# Patient Record
Sex: MALE | Race: WHITE | URBAN - METROPOLITAN AREA
[De-identification: names, ages, dates, MRNs, and addresses within clinical notes are randomized per-mention and may not be internally consistent; named-entity substitution may affect disease eponyms.]

---

## 2017-04-24 PROCEDURE — 81003 URINALYSIS AUTO W/O SCOPE: CPT | Performed by: FAMILY MEDICINE

## 2017-04-26 PROBLEM — E78.00 PURE HYPERCHOLESTEROLEMIA: Status: ACTIVE | Noted: 2017-04-26

## 2017-04-26 PROBLEM — J32.9 CHRONIC CONGESTION OF PARANASAL SINUS: Status: ACTIVE | Noted: 2017-04-26

## 2017-04-26 PROBLEM — Z00.00 ROUTINE GENERAL MEDICAL EXAMINATION AT A HEALTH CARE FACILITY: Status: ACTIVE | Noted: 2017-04-26

## 2017-04-26 PROBLEM — N40.1 BENIGN NON-NODULAR PROSTATIC HYPERPLASIA WITH LOWER URINARY TRACT SYMPTOMS: Status: ACTIVE | Noted: 2017-04-26

## 2017-04-26 PROBLEM — H93.13 TINNITUS, BILATERAL: Status: ACTIVE | Noted: 2017-04-26

## 2018-01-16 ENCOUNTER — OFFICE VISIT (OUTPATIENT)
Dept: FAMILY MEDICINE CLINIC | Facility: CLINIC | Age: 53
End: 2018-01-16

## 2018-01-16 VITALS
RESPIRATION RATE: 16 BRPM | OXYGEN SATURATION: 99 % | TEMPERATURE: 98 F | HEIGHT: 69 IN | SYSTOLIC BLOOD PRESSURE: 136 MMHG | BODY MASS INDEX: 29.33 KG/M2 | HEART RATE: 58 BPM | DIASTOLIC BLOOD PRESSURE: 80 MMHG | WEIGHT: 198 LBS

## 2018-01-16 DIAGNOSIS — J98.01 COUGH DUE TO BRONCHOSPASM: ICD-10-CM

## 2018-01-16 DIAGNOSIS — J20.9 ACUTE BRONCHITIS, UNSPECIFIED ORGANISM: Primary | ICD-10-CM

## 2018-01-16 PROCEDURE — 94640 AIRWAY INHALATION TREATMENT: CPT | Performed by: NURSE PRACTITIONER

## 2018-01-16 PROCEDURE — 99203 OFFICE O/P NEW LOW 30 MIN: CPT | Performed by: NURSE PRACTITIONER

## 2018-01-16 RX ORDER — ALBUTEROL SULFATE 2.5 MG/3ML
2.5 SOLUTION RESPIRATORY (INHALATION) ONCE
Status: COMPLETED | OUTPATIENT
Start: 2018-01-16 | End: 2018-01-16

## 2018-01-16 RX ADMIN — ALBUTEROL SULFATE 2.5 MG: 2.5 SOLUTION RESPIRATORY (INHALATION) at 15:16:00

## 2018-01-16 NOTE — PATIENT INSTRUCTIONS
· Use Albuterol inhaler 2 puffs every 4-6 hours while awake for the next 48 hours, then every 4-6 hours as needed for cough spasms/difficulty breathing/wheezing/shortness of breath.     · Drink a lot of fluids; increase rest  · Use cool mist humidifier  · O medicines. Also talk to your provider if you are taking medicine to prevent blood clots.) Aspirin should never be given to anyone younger than 25years of age who is ill with a viral infection or fever. It may cause severe liver or brain damage.   · Your ap breath, or pain with breathing  Date Last Reviewed: 9/13/2015  © 8962-4090 [employer]. [address] [facility], [facility] [place]. All rights reserved. This information is not intended as a substitute for professional medical care.  Always fol

## 2018-01-16 NOTE — PROGRESS NOTES
CHIEF COMPLAINT:   Patient presents with:  Cough: chest congestion        HPI:   Arron Quan is a 47year old male who presents for cough for  4  days. Reports sx began 7 days ago with sudden onset of fever to 100F, body aches and sinus congestion.   Whit Babcock GENERAL: well developed, well nourished,in no apparent distress  SKIN: no rashes, no suspicious lesions  EYES: Conjunctiva clear. No scleral icterus. HENT: Atraumatic, normocephalic. TM's clear bilaterally.   Nostrils patent, nasal mucosa mildly inflamed · If your symptoms worsen, you become short-of-breath, develop fever, worse cough etc, you must follow-up with a physician immediately or go to the emergency room.          Viral or Bacterial Bronchitis with Wheezing (Adult)    Bronchitis is an infection of · Your appetite may be poor, so a light diet is fine. Avoid dehydration by drinking 6 to 8 glasses of fluids per day (such as water, soft drinks, sports drinks, juices, tea, or soup). Extra fluids will help loosen secretions in the nose and lungs.   · Over- © 9123-2528 [employer]. 1407 [facility], [facility] [place]. All rights reserved. This information is not intended as a substitute for professional medical care. Always follow your healthcare professional's instructions.             The

## 2018-05-25 PROCEDURE — 81003 URINALYSIS AUTO W/O SCOPE: CPT | Performed by: FAMILY MEDICINE

## 2018-07-10 PROBLEM — E78.00 PURE HYPERCHOLESTEROLEMIA: Chronic | Status: ACTIVE | Noted: 2017-04-26

## 2018-07-10 PROBLEM — K42.9 UMBILICAL HERNIA WITHOUT OBSTRUCTION AND WITHOUT GANGRENE: Status: ACTIVE | Noted: 2018-07-10

## 2018-07-16 PROBLEM — M16.0 PRIMARY OSTEOARTHRITIS OF BOTH HIPS: Status: ACTIVE | Noted: 2018-07-16

## 2019-03-14 PROCEDURE — 81003 URINALYSIS AUTO W/O SCOPE: CPT | Performed by: FAMILY MEDICINE

## 2019-04-04 PROBLEM — D12.3 ADENOMATOUS POLYP OF TRANSVERSE COLON: Status: ACTIVE | Noted: 2019-04-04

## 2019-04-04 PROBLEM — M79.642 PAIN IN BOTH HANDS: Status: ACTIVE | Noted: 2019-04-04

## 2019-04-04 PROBLEM — M79.641 PAIN IN BOTH HANDS: Status: ACTIVE | Noted: 2019-04-04

## 2019-05-15 PROCEDURE — 86704 HEP B CORE ANTIBODY TOTAL: CPT | Performed by: INTERNAL MEDICINE

## 2019-05-15 PROCEDURE — 80074 ACUTE HEPATITIS PANEL: CPT | Performed by: INTERNAL MEDICINE

## 2019-08-02 ENCOUNTER — OFFICE VISIT (OUTPATIENT)
Dept: FAMILY MEDICINE CLINIC | Facility: CLINIC | Age: 54
End: 2019-08-02
Payer: COMMERCIAL

## 2019-08-02 VITALS
SYSTOLIC BLOOD PRESSURE: 128 MMHG | BODY MASS INDEX: 29.62 KG/M2 | RESPIRATION RATE: 16 BRPM | DIASTOLIC BLOOD PRESSURE: 82 MMHG | TEMPERATURE: 98 F | WEIGHT: 200 LBS | HEART RATE: 60 BPM | HEIGHT: 69 IN | OXYGEN SATURATION: 98 %

## 2019-08-02 DIAGNOSIS — J01.90 ACUTE BACTERIAL SINUSITIS: Primary | ICD-10-CM

## 2019-08-02 DIAGNOSIS — B96.89 ACUTE BACTERIAL SINUSITIS: Primary | ICD-10-CM

## 2019-08-02 PROCEDURE — 99213 OFFICE O/P EST LOW 20 MIN: CPT | Performed by: NURSE PRACTITIONER

## 2019-08-02 RX ORDER — AMOXICILLIN AND CLAVULANATE POTASSIUM 875; 125 MG/1; MG/1
1 TABLET, FILM COATED ORAL 2 TIMES DAILY
Qty: 20 TABLET | Refills: 0 | Status: SHIPPED | OUTPATIENT
Start: 2019-08-02 | End: 2019-08-12

## 2019-08-02 RX ORDER — FLUTICASONE PROPIONATE 50 MCG
SPRAY, SUSPENSION (ML) NASAL
Qty: 1 BOTTLE | Refills: 0 | Status: SHIPPED | OUTPATIENT
Start: 2019-08-02

## 2019-08-02 NOTE — PROGRESS NOTES
CHIEF COMPLAINT:   Patient presents with:  Cough: chest congestion x 5 weeks      HPI:   Madalyn Hernandez is a 53year old male who presents for sinus and chest congestion. Reports had severe cold sx with sinus congestion and freq cough 5 weeks ago.  Reports /82   Pulse 60   Temp 98.2 °F (36.8 °C)   Resp 16   Ht 69\"   Wt 200 lb   SpO2 98%   BMI 29.53 kg/m²   GENERAL: well developed, well nourished, in no apparent distress; pt is well appearing  HEAD: atraumatic, normocephalic  EYES: conjunctiva clear  E The sinuses are air-filled spaces within the bones of the face. They connect to the inside of the nose. Sinusitis is an inflammation of the tissue that lines the sinuses. Sinusitis can occur during a cold.  It can also happen due to allergies to pollens and · Do not use nasal rinses or irrigation during an acute sinus infection, unless your healthcare provider tells you to. Rinsing may spread the infection to other areas in your sinuses.   · Use acetaminophen or ibuprofen to control pain, unless another pain m

## 2020-01-24 ENCOUNTER — OFFICE VISIT (OUTPATIENT)
Dept: FAMILY MEDICINE CLINIC | Facility: CLINIC | Age: 55
End: 2020-01-24
Payer: COMMERCIAL

## 2020-01-24 VITALS
OXYGEN SATURATION: 98 % | WEIGHT: 195 LBS | SYSTOLIC BLOOD PRESSURE: 116 MMHG | DIASTOLIC BLOOD PRESSURE: 70 MMHG | BODY MASS INDEX: 28.88 KG/M2 | HEIGHT: 69 IN | HEART RATE: 65 BPM | TEMPERATURE: 98 F | RESPIRATION RATE: 20 BRPM

## 2020-01-24 DIAGNOSIS — B35.4 TINEA CORPORIS: Primary | ICD-10-CM

## 2020-01-24 PROCEDURE — 99213 OFFICE O/P EST LOW 20 MIN: CPT | Performed by: NURSE PRACTITIONER

## 2020-01-24 RX ORDER — NYSTATIN 100000 U/G
1 CREAM TOPICAL 2 TIMES DAILY
Qty: 1 TUBE | Refills: 0 | Status: SHIPPED | OUTPATIENT
Start: 2020-01-24 | End: 2021-03-25 | Stop reason: ALTCHOICE

## 2020-01-24 NOTE — PROGRESS NOTES
CHIEF COMPLAINT:   Patient presents with:  Rash Skin Problem         HPI:    Arron Quan is a 53year old male who presents for evaluation of a rash. Per patient rash started in the past 1  weeks. Rash has been waxing and  since onset.   Patient denies HEENT: Denies rhinorrhea, edema of the lips or swelling of throat. CARDIOVASCULAR: Denies chest pains or palpitations. LUNGS: Denies shortness of breath with exertion or rest. No cough or wheezing. LYMPH: Denies enlargement of the lymph nodes.         EX A fungal infection occurs when too much fungus grows on or in the body. Fungus normally lives on the skin in small amounts and does not cause harm. But when too much grows on the skin, it causes an infection. This is also known as tinea.  Fungal skin infect · Let your skin dry completely after bathing. Carefully dry your feet and between your toes. · Dress in loose cotton clothing. · Don’t scratch the affected area. This can delay healing and may spread the infection.  It can also cause a bacterial infection

## 2021-03-25 ENCOUNTER — APPOINTMENT (OUTPATIENT)
Dept: GENERAL RADIOLOGY | Age: 56
End: 2021-03-25
Attending: EMERGENCY MEDICINE
Payer: COMMERCIAL

## 2021-03-25 ENCOUNTER — HOSPITAL ENCOUNTER (OUTPATIENT)
Age: 56
Discharge: HOME | End: 2021-03-25
Attending: EMERGENCY MEDICINE
Payer: COMMERCIAL

## 2021-03-25 VITALS
DIASTOLIC BLOOD PRESSURE: 83 MMHG | TEMPERATURE: 98 F | HEART RATE: 65 BPM | OXYGEN SATURATION: 98 % | RESPIRATION RATE: 16 BRPM | SYSTOLIC BLOOD PRESSURE: 132 MMHG

## 2021-03-25 DIAGNOSIS — S46.011A ROTATOR CUFF STRAIN, RIGHT, INITIAL ENCOUNTER: Primary | ICD-10-CM

## 2021-03-25 PROCEDURE — 99213 OFFICE O/P EST LOW 20 MIN: CPT

## 2021-03-25 PROCEDURE — 99203 OFFICE O/P NEW LOW 30 MIN: CPT

## 2021-03-25 PROCEDURE — 73030 X-RAY EXAM OF SHOULDER: CPT | Performed by: EMERGENCY MEDICINE

## 2021-03-25 NOTE — ED INITIAL ASSESSMENT (HPI)
c/o right shoulder injury-was lifting some weights approximately 250 lbs. about 1.5 hours prior to arrival then heard a cracking sound. Limited range of motion. Took 600 mg of Ibuprofen.

## 2021-03-25 NOTE — ED PROVIDER NOTES
Patient Seen in: [facility]      History   Patient presents with:  Shoulder Injury    Stated Complaint: RIGHT SHOULDER INJURY     HPI/Subjective:   HPI    39-year-old male who was bench pressing 250 pounds, a bit more than usual.  This oc pallor. Oropharynx clear, mucous membranes moist   Extremities: Maximal pain with abduction of the right shoulder. There is minimal tenderness of the right shoulder. There is no bony deformity.   No edema, normal peripheral pulses   Neuro: Alert oriented time.      Disposition and Plan     Clinical Impression:  Rotator cuff strain, right, initial encounter  (primary encounter diagnosis)    Disposition:  Discharge  3/25/2021  5:05 pm    Follow-up:  Nakia Brito MD  [address]

## 2021-05-12 PROBLEM — S46.011D TRAUMATIC COMPLETE TEAR OF RIGHT ROTATOR CUFF, SUBSEQUENT ENCOUNTER: Status: ACTIVE | Noted: 2021-05-12

## 2022-06-29 ENCOUNTER — OFFICE VISIT (OUTPATIENT)
Dept: FAMILY MEDICINE CLINIC | Facility: CLINIC | Age: 57
End: 2022-06-29
Payer: COMMERCIAL

## 2022-06-29 VITALS
RESPIRATION RATE: 18 BRPM | SYSTOLIC BLOOD PRESSURE: 128 MMHG | HEIGHT: 69 IN | OXYGEN SATURATION: 98 % | HEART RATE: 85 BPM | TEMPERATURE: 100 F | WEIGHT: 185 LBS | DIASTOLIC BLOOD PRESSURE: 68 MMHG | BODY MASS INDEX: 27.4 KG/M2

## 2022-06-29 DIAGNOSIS — J06.9 VIRAL URI: Primary | ICD-10-CM

## 2022-06-29 DIAGNOSIS — Z20.822 ENCOUNTER FOR LABORATORY TESTING FOR COVID-19 VIRUS: ICD-10-CM

## 2022-06-29 PROCEDURE — 99213 OFFICE O/P EST LOW 20 MIN: CPT | Performed by: NURSE PRACTITIONER

## 2022-06-29 PROCEDURE — 3074F SYST BP LT 130 MM HG: CPT | Performed by: NURSE PRACTITIONER

## 2022-06-29 PROCEDURE — 3008F BODY MASS INDEX DOCD: CPT | Performed by: NURSE PRACTITIONER

## 2022-06-29 PROCEDURE — 3078F DIAST BP <80 MM HG: CPT | Performed by: NURSE PRACTITIONER

## 2022-06-29 RX ORDER — FLUTICASONE PROPIONATE 50 MCG
2 SPRAY, SUSPENSION (ML) NASAL DAILY
Qty: 1 EACH | Refills: 0 | Status: SHIPPED | OUTPATIENT
Start: 2022-06-29 | End: 2022-07-13

## 2022-06-30 LAB — SARS-COV-2 RNA RESP QL NAA+PROBE: NOT DETECTED

## 2022-07-01 ENCOUNTER — OFFICE VISIT (OUTPATIENT)
Dept: FAMILY MEDICINE CLINIC | Facility: CLINIC | Age: 57
End: 2022-07-01
Payer: COMMERCIAL

## 2022-07-01 VITALS
WEIGHT: 185 LBS | DIASTOLIC BLOOD PRESSURE: 68 MMHG | HEART RATE: 75 BPM | RESPIRATION RATE: 18 BRPM | HEIGHT: 69 IN | BODY MASS INDEX: 27.4 KG/M2 | OXYGEN SATURATION: 98 % | SYSTOLIC BLOOD PRESSURE: 114 MMHG | TEMPERATURE: 99 F

## 2022-07-01 DIAGNOSIS — R21 RASH AND NONSPECIFIC SKIN ERUPTION: Primary | ICD-10-CM

## 2022-07-01 PROCEDURE — 3074F SYST BP LT 130 MM HG: CPT | Performed by: NURSE PRACTITIONER

## 2022-07-01 PROCEDURE — 3008F BODY MASS INDEX DOCD: CPT | Performed by: NURSE PRACTITIONER

## 2022-07-01 PROCEDURE — 99213 OFFICE O/P EST LOW 20 MIN: CPT | Performed by: NURSE PRACTITIONER

## 2022-07-01 PROCEDURE — 3078F DIAST BP <80 MM HG: CPT | Performed by: NURSE PRACTITIONER

## 2022-07-01 RX ORDER — METHYLPREDNISOLONE 4 MG/1
TABLET ORAL
Qty: 1 EACH | Refills: 0 | Status: SHIPPED | OUTPATIENT
Start: 2022-07-01

## 2025-01-02 ENCOUNTER — OFFICE VISIT (OUTPATIENT)
Dept: FAMILY MEDICINE CLINIC | Facility: CLINIC | Age: 60
End: 2025-01-02
Payer: COMMERCIAL

## 2025-01-02 VITALS
DIASTOLIC BLOOD PRESSURE: 82 MMHG | WEIGHT: 201 LBS | SYSTOLIC BLOOD PRESSURE: 134 MMHG | TEMPERATURE: 98 F | OXYGEN SATURATION: 98 % | HEART RATE: 68 BPM | RESPIRATION RATE: 16 BRPM | BODY MASS INDEX: 30 KG/M2

## 2025-01-02 DIAGNOSIS — J40 SINOBRONCHITIS: Primary | ICD-10-CM

## 2025-01-02 DIAGNOSIS — J32.9 SINOBRONCHITIS: Primary | ICD-10-CM

## 2025-01-02 PROCEDURE — 3079F DIAST BP 80-89 MM HG: CPT | Performed by: NURSE PRACTITIONER

## 2025-01-02 PROCEDURE — 99213 OFFICE O/P EST LOW 20 MIN: CPT | Performed by: NURSE PRACTITIONER

## 2025-01-02 PROCEDURE — 3075F SYST BP GE 130 - 139MM HG: CPT | Performed by: NURSE PRACTITIONER

## 2025-01-02 RX ORDER — ATORVASTATIN CALCIUM 10 MG/1
10 TABLET, FILM COATED ORAL DAILY
COMMUNITY
Start: 2024-07-10

## 2025-01-02 RX ORDER — DOXYCYCLINE 100 MG/1
100 CAPSULE ORAL 2 TIMES DAILY
Qty: 14 CAPSULE | Refills: 0 | Status: SHIPPED | OUTPATIENT
Start: 2025-01-02 | End: 2025-01-09

## 2025-01-02 NOTE — PROGRESS NOTES
HPI:   Torsten Urias is a 59 year old male who presents with ill symptoms for  1  months. Patient reports ongoing/intermittent chest heaviness/pain, congestion, sinus drainage and headache, malaise and fatigue. Denies fever, notes cough with no production. History of sinus surgery and feels as if he continues to get sinus infections/pressure post surgery, has upcoming appointment with ENT for evaluation. Notes over the last two days increased chest pressure, fatigue and cough. Has tried cold and sinus medication with some temporary relief. No new contacts are sick.    Current Outpatient Medications   Medication Sig Dispense Refill    atorvastatin 10 MG Oral Tab Take 1 tablet (10 mg total) by mouth daily.       No current facility-administered medications for this visit.      Past Medical History:    Arthritis    Other and unspecified hyperlipidemia      Past Surgical History:   Procedure Laterality Date    Back surgery  2000    Colonoscopy,biopsy N/A 12/2/2014    Procedure: COLONOSCOPY, POSSIBLE BIOPSY, POSSIBLE POLYPECTOMY 37614;  Surgeon: Mathieu Chairez MD;  Location: [facility], [facility]    Other surgical history      leg fx      Family History   Problem Relation Age of Onset    Hypertension Father     Lipids Father     Other (Other) Father         CVA    Arthritis Mother     Other (Other) Mother         dementia      Social History     Socioeconomic History    Marital status:      Spouse name: Leena    Number of children: 2    Highest education level: 1st grade   Occupational History    Occupation: [employer]   Tobacco Use    Smoking status: Never    Smokeless tobacco: Never   Vaping Use    Vaping status: Never Used   Substance and Sexual Activity    Alcohol use: Yes     Alcohol/week: 0.0 standard drinks of alcohol     Comment: beer; 3 beers espec weekends    Drug use: No   Other Topics Concern     Service Yes     Comment: [employer] x 4 yr;     Blood Transfusions No    Caffeine Concern No     Occupational Exposure No    Hobby Hazards No    Sleep Concern No    Stress Concern No    Weight Concern No    Special Diet No    Back Care Yes    Exercise Yes    Seat Belt Yes   Social History Narrative    Likes to exercise and work out    Likes archery    Likes bowling     Social Drivers of Health     Financial Resource Strain: Low Risk  (9/18/2019)    Received from [facility]    Overall Financial Resource Strain (CARDIA)     Difficulty of Paying Living Expenses: Not hard at all   Food Insecurity: Food Insecurity Present (9/18/2019)    Received from [facility]    Hunger Vital Sign     Worried About Running Out of Food in the Last Year: Sometimes true     Ran Out of Food in the Last Year: Sometimes true         REVIEW OF SYSTEMS:   GENERAL: feels well otherwise, fatigued as above  HEENT: congested, as above in HPI  LUNGS: notes some shortness of breath with exertion  CARDIOVASCULAR: denies chest pain on exertion  GI: no nausea or abdominal pain, appetite down  NEURO: admits to sinus headaches    EXAM:   /82   Pulse 68   Temp 98.4 °F (36.9 °C) (Oral)   Resp 16   Wt 201 lb (91.2 kg)   SpO2 98%   BMI 29.68 kg/m²   GENERAL: well developed, well nourished,in no apparent distress, feels warmer than temperature today  HEENT: atraumatic, normocephalic,ears full and clear, nares with erythema and edema, mild rhinorrhea, throat pink and clear. [place] midline.  No sinus tenderness with palpation.  NECK: supple,no adenopathy  LUNGS: clear to auscultation, breathing is non labored  CARDIO: RRR without murmur      ASSESSMENT AND PLAN:    PLAN:Torsten was seen today for sinus problem.    Diagnoses and all orders for this visit:    Sinobronchitis  -     doxycycline 100 MG Oral Cap; Take 1 capsule (100 mg total) by mouth 2 (two) times daily for 7 days.      At home covid test negative recently, symptoms ongoing/waxing and waning x 1 month. Will treat with Doxy, close follow up with ENT. Self care  discussed. Medication use and risk/benefit discussed. Patient is advised to follow up with PCP if not improving with treatment plan or seek immediate care if symptoms worsen.  The patient indicates understanding of these issues and agrees to the plan.  Patient Instructions    PLAN: Doxycycline, take as directed. Finish all the medication even if you feel better. Take with full glass of water and food to avoid heartburn/stomach pain.  Use saline rinses daily as discussed. Keep appointment with ENT for follow up due to post sinus surgery issues.  Saline nasal spray to nostrils if needed to help remove drainage or congestion in nose.   Hydrate! (cold or hot based on comfort). Drink lots of water or other non dehydrating liquids to help with illness.   Hand washing-use hand  or wash hands frequently, cover your cough or sneeze, do not share towels or drinks with others.  May use Tylenol or Ibuprofen over the counter for pain/comfort if not contraindicated.  Follow up in 2 weeks with Dr. Gonzalez if symptoms persist, or sooner if worsening symptoms. Seek immediate care if inability to swallow or breathe.

## 2025-01-02 NOTE — PATIENT INSTRUCTIONS
PLAN: Doxycycline, take as directed. Finish all the medication even if you feel better. Take with full glass of water and food to avoid heartburn/stomach pain.  Use saline rinses daily as discussed. Keep appointment with ENT for follow up due to post sinus surgery issues.  Saline nasal spray to nostrils if needed to help remove drainage or congestion in nose.   Hydrate! (cold or hot based on comfort). Drink lots of water or other non dehydrating liquids to help with illness.   Hand washing-use hand  or wash hands frequently, cover your cough or sneeze, do not share towels or drinks with others.  May use Tylenol or Ibuprofen over the counter for pain/comfort if not contraindicated.  Follow up in 2 weeks with Dr. Gonzalez if symptoms persist, or sooner if worsening symptoms. Seek immediate care if inability to swallow or breathe.

## (undated) NOTE — LETTER
Date & Time: 3/25/2021, 4:12 PM  Patient: Dottie Bahena  Encounter Provider(s):    Dex Noriega MD       To Whom It May Concern:    Carola Ruvalcaba was seen and treated in our department on 3/25/2021.  He can return to work with these limitations: no l